# Patient Record
Sex: MALE | Race: WHITE | URBAN - METROPOLITAN AREA
[De-identification: names, ages, dates, MRNs, and addresses within clinical notes are randomized per-mention and may not be internally consistent; named-entity substitution may affect disease eponyms.]

---

## 2024-07-16 ENCOUNTER — OFFICE VISIT (OUTPATIENT)
Dept: FAMILY MEDICINE CLINIC | Facility: CLINIC | Age: 54
End: 2024-07-16

## 2024-07-16 VITALS
DIASTOLIC BLOOD PRESSURE: 80 MMHG | BODY MASS INDEX: 26.28 KG/M2 | HEIGHT: 72 IN | WEIGHT: 194 LBS | HEART RATE: 78 BPM | SYSTOLIC BLOOD PRESSURE: 134 MMHG

## 2024-07-16 DIAGNOSIS — Z02.89 ENCOUNTER FOR FEDERAL AVIATION ADMINISTRATION (FAA) EXAMINATION: Primary | ICD-10-CM

## 2024-07-16 PROCEDURE — 99499 UNLISTED E&M SERVICE: CPT | Performed by: FAMILY MEDICINE

## 2024-07-16 NOTE — PROGRESS NOTES
Chief Complaint   Patient presents with   • Physical Exam     [employer] 3rd class wears correctives terrence